# Patient Record
Sex: FEMALE | Race: WHITE | Employment: FULL TIME | URBAN - METROPOLITAN AREA
[De-identification: names, ages, dates, MRNs, and addresses within clinical notes are randomized per-mention and may not be internally consistent; named-entity substitution may affect disease eponyms.]

---

## 2017-04-26 PROBLEM — E06.3 HASHIMOTO'S DISEASE: Status: ACTIVE | Noted: 2017-04-26

## 2017-04-26 PROBLEM — E66.9 OBESITY: Status: ACTIVE | Noted: 2017-04-26

## 2017-04-26 NOTE — H&P
New Patient Evaluation - History and Physical    CONSULT - Reason for Visit: Hashimoto's disease, Obesity  Requesting Physician: Rock Santillan MD    CHIEF COMPLAINT:    Hashimoto's disease   Obesity    HISTORY OF PRESENT ILLNESS:   Marii Nava is a 40 HISTORY:   Family History   Problem Relation Age of Onset   • Alcohol and Other Disorders Associated Father      alcoholism/cirrhosis   • Fibromyalgia Sister        ASSESSMENTS:     REVIEW OF SYSTEMS:  Constitutional: Negative for: weight change, fever, fa ASSESSMENT AND PLAN:      40year old female with    1. Hashimoto's disease. She was hypothyroid in 2013 ( per labs)   She has been off medication since last year. Discussed hashimotos' thyroiditis and hypothyroidism in detail.    Discussed that

## 2017-04-28 NOTE — TELEPHONE ENCOUNTER
Please let the patient know that her thyroid function is low which means she needs to be on treatment with levothyroxine. I recommend starting LT 4 125 daily. This is a weight based dose. Does she want LT4/ synthroid. Please prescribe accordingly.    Vanessa Escamilla

## 2017-04-28 NOTE — TELEPHONE ENCOUNTER
Spoke with Dean Oconnell and informed her of Dr. Deion Lofton message below. Patient understands Dr. Lamont Dye does not recommend that she gets pregnant at this time.  Advised patient to contact office and request to speak with RN if she becomes pregnant to determ

## 2017-08-23 NOTE — PROGRESS NOTES
Return Office Visit     CHIEF COMPLAINT:  Patient presents with:  Thyroid Problem: Hashimoto's disease. BP low. Asymptomatic.         HISTORY OF PRESENT ILLNESS:  Thelma Gonzalez is a 40year old female who presents for follow up for for Hashimoto's diseas for: headache, numbness, weakness  Genito-Urinary: Negative for: dysuria, frequency  Psychiatric: Negative for:  depression, anxiety  Hematology/Lymphatics: Negative for: bruising, lower extremity edema  Endocrine: Negative for: polyuria, polydypsia  Skin: (conversational) exercise.    b) Start slowly and build up gradually    RTC in 6 months.                    Orders Placed This Encounter      Assay, Thyroid Stim Hormone      Free T4, (Free Thyroxine)      8/23/2017  Adrian Desouza MD

## 2017-08-29 NOTE — TELEPHONE ENCOUNTER
LT4 needs to be increased to 137 . Please confirm she is on 125 mcg daily  Repeat TSH and FT4 in 6-8 weeks. Order both please. I will see her in 6 months. Thanks.

## 2017-08-29 NOTE — TELEPHONE ENCOUNTER
Called Shane Graf. Confirmed that she is taking 125 mcg daily. Informed her of Dr. Roni Marin instructions. She verbalized her understanding. Sent Rx. Ordered labs. Pt would prefer to call back to make appt.

## 2018-01-10 NOTE — TELEPHONE ENCOUNTER
Sent letter to patient asking her to schedule follow up in 3 months and complete labwork. Lab orders included.

## 2018-04-21 NOTE — PROGRESS NOTES
HPI:    Patient ID: Jana Velazquez is a 56year old female. HPI came in today complaining of cough  and  congestion.   According to her her  symptoms started on Monday with congestion and for last few days says she is having more sore throat and diffic nervous/anxious. Current Outpatient Prescriptions:  benzonatate 100 MG Oral Cap Take 1 capsule (100 mg total) by mouth 3 (three) times daily as needed.  Disp: 30 capsule Rfl: 0   LEVOTHYROXINE SODIUM 137 MCG Oral Tab TAKE 1 TABLET BY MOUTH DAILY clear and moist and mucous membranes are normal. Mucous membranes are not cyanotic. No oropharyngeal exudate, posterior oropharyngeal edema or posterior oropharyngeal erythema.    Eyes: Conjunctivae and EOM are normal. Pupils are equal, round, and reactive This Visit:  Signed Prescriptions Disp Refills    benzonatate 100 MG Oral Cap 30 capsule 0      Sig: Take 1 capsule (100 mg total) by mouth 3 (three) times daily as needed.            Imaging & Referrals:  None        RT#[id number]

## 2018-04-21 NOTE — PATIENT INSTRUCTIONS
Jourdan Loo, acute- her strep test is negative, advised to drink plenty of fluids plenty of rest I will send Tessalon Perles for the cough if not better if any fever, cough getting worse, call us

## 2019-06-05 PROBLEM — E03.8 OTHER SPECIFIED HYPOTHYROIDISM: Status: ACTIVE | Noted: 2019-06-05

## 2019-06-05 NOTE — PROGRESS NOTES
Return Office Visit     CHIEF COMPLAINT:  Patient presents with:   Follow - Up: Hypothyroidism, hashimotos       HISTORY OF PRESENT ILLNESS:  Geremias Piña is a 53year old female who presents for follow up for hypothyroidism and obesity.     She was kelle palpitations, orthopnea  GI: Negative for:  abdominal pain, nausea, vomiting, diarrhea, constipation, bleeding  Neurology: Negative for: headache, numbness, weakness  Genito-Urinary: Negative for: dysuria, frequency  Psychiatric: Negative for:  depression, IUD  Does not desire pregnancy in the future  Patient understands side effects ( paresthesias, dry mouth, constipation, dysguesia, nasopharyngitis, insomnia) and Contraindications (Recent or unstable cardiac or cerebrovascular disease, pregnancy, glaucoma,

## 2019-06-07 NOTE — TELEPHONE ENCOUNTER
Reviewed thyroid labs  TSH is very high  Called patient, no answer   Please try again  She had reported compliance with medication on LOV.    Please confirm compliance  Please screen for hypothyroidism symptoms  If she is not compliant: start taking medicat

## 2019-06-07 NOTE — TELEPHONE ENCOUNTER
Pt ran out of medication 3 mos ago - RN advised her to resume levothyroxine 137 mcg medication 30-60 mins before breakfast and recheck labs in 6 weeks. RN stressed compliance. Pt verbalized understanding.     Forwarded to AM for review

## 2019-08-06 NOTE — PROGRESS NOTES
Return Office Visit     CHIEF COMPLAINT:  Patient presents with:   Follow - Up: Hashimotos disease, hypothyroidism, weight loss check       HISTORY OF PRESENT ILLNESS:  Maureen Bustamante is a 53year old female who presents for follow up for hypothyroidism a dysphonia  Respiratory: Negative for:  dyspnea, cough  Cardiovascular: Negative for:  chest pain, palpitations, orthopnea  GI: Negative for:  abdominal pain, nausea, vomiting, diarrhea, constipation, bleeding  Neurology: Negative for: headache, numbness, w phentermine-topiramate  Prescribed  Menstrual cycles regular, has an IUD  Does not desire pregnancy in the future  Patient understands side effects ( paresthesias, dry mouth, constipation, dysguesia, nasopharyngitis, insomnia) and Contraindications (Recent

## 2019-09-30 NOTE — TELEPHONE ENCOUNTER
Pt called to give Dr. Leticia Mendez an update after taking phentermine 30 mg for one month. Pt would like to continue taking 30mg so she will need new RX for 30mg. Please call.

## 2019-10-15 NOTE — TELEPHONE ENCOUNTER
LOV 08/06/2019  F/U 11/01/2019    Patient message: Lynda Leggett have a follow up on Nov 1 with Dr Michelle Padgett. Will take my labs on Oct 26th. but I took my last pill today. Pharmacy requested refill days ago but status says delayed.  \"

## 2019-11-01 NOTE — PROGRESS NOTES
Return Office Visit     CHIEF COMPLAINT:  Patient presents with:   Follow - Up: Hypothyroidism       HISTORY OF PRESENT ILLNESS:  Kirk Gordon is a 51year old female who presents for follow up for hypothyroidism and obesity.     She was diagnosed aroun chest pain, palpitations, orthopnea  GI: Negative for:  abdominal pain, nausea, vomiting, diarrhea, constipation, bleeding  Neurology: Negative for: headache, numbness, weakness  Genito-Urinary: Negative for: dysuria, frequency  Psychiatric: Negative for: future  Patient understands side effects ( paresthesias, dry mouth, constipation, dysguesia, nasopharyngitis, insomnia) and Contraindications (Recent or unstable cardiac or cerebrovascular disease, pregnancy, glaucoma, hyperthyroidism, Monoamine oxidase in

## 2020-02-12 NOTE — PROGRESS NOTES
Return Office Visit     CHIEF COMPLAINT:  Patient presents with:   Follow - Up: Hypothyroidism, obesity       HISTORY OF PRESENT ILLNESS:  Fantasma Laguerre is a 51year old female who presents for follow up for hypothyroidism and obesity.     She was diagno for: fever, fatigue, cold/heat intolerance, + weight loss ( intentional)  Eyes: Negative for:  Visual changes, proptosis, blurring  ENT: Negative for:  dysphagia, neck swelling, dysphonia  Respiratory: Negative for:  dyspnea, cough  Cardiovascular: Negativ daily walks   a) Equal to or more than 150 hours of moderate intensity (conversational) exercise.      3. C.w phentermine-topiramate  Prescribed, increased dose of phentermine  Menstrual cycles regular ( DLMP: about three weeks ago), has an IUD  Does not de

## 2020-06-05 NOTE — TELEPHONE ENCOUNTER
Spoke with pt, she was just asking if it was okay to convert her follow up visit on 6/10 to phone visit? She picked up her prescription for levothyroxine this past weekend and cannot find it anywhere. Her last dose was Monday, 6/1. LOV 2/12/20.  [employer]

## 2020-06-05 NOTE — TELEPHONE ENCOUNTER
Pt states she would like to changer her 6-10 appt to a telephone visit and also pt states she picked up her prescription for levothyroxine and can not find it and took her last pill on Monday.  Please advise

## 2020-06-10 NOTE — TELEPHONE ENCOUNTER
Please book at 9:45 am on 9/9  In person  Orlando Witt open the schedule for wed . Fridays not sure why these are closed    Thanks

## 2020-06-10 NOTE — PROGRESS NOTES
Simran Pinedo verbally consents to a phone visit on 6/10/2020     Patient understands and accepts financial responsibility for any deductible, co-insurance and/or co-pays associated with this service. Patient has been referred to the [facility] website at www. reviewed.     ASSESSMENTS:       REVIEW OF SYSTEMS:  Constitutional: Negative for: fever, fatigue, cold/heat intolerance, + weight gain  Eyes: Negative for:  Visual changes, proptosis, blurring  ENT: Negative for:  dysphagia, neck swelling, dysphonia  Respi Contraindications (Recent or unstable cardiac or cerebrovascular disease, pregnancy, glaucoma, hyperthyroidism, Monoamine oxidase inhibitor use ). RTC in 3 months.      Patient verbalized a complete  understanding of all of the above and did not have any

## 2020-09-09 NOTE — PROGRESS NOTES
Return Office Visit     CHIEF COMPLAINT:  Patient presents with:  Hypothyroidism: follow up       HISTORY OF PRESENT ILLNESS:  Courtney Shi is a 51year old female who presents for follow up for hypothyroidism and obesity.     She was diagnosed around depression, anxiety  Hematology/Lymphatics: Negative for: bruising, lower extremity edema  Endocrine: Negative for: polyuria, polydypsia  Skin: Negative for: rash, blister, cellulitis,       PHYSICAL EXAM:    09/09/20  0953   BP: 90/62   Pulse: 67   Resp: Monoamine oxidase inhibitor use ). RTC in 3 months. Patient verbalized a complete  understanding of all of the above and did not have any further questions.                    No orders of the defined types were placed in this encounter.         Felicityt

## 2020-09-11 NOTE — TELEPHONE ENCOUNTER
[facility] called to confirm additional refills:       Current Outpatient Medications:   •  Levothyroxine Sodium 137 MCG Oral Tab, TAKE 1 TABLET BY MOUTH DAILY BEFORE BREAKFAST, Disp: 90 tablet, Rfl: 0  •  topiramate 50 MG Oral Tab, Take 1 tablet (

## 2021-07-06 NOTE — PROGRESS NOTES
Return Office Visit     CHIEF COMPLAINT:  Patient presents with:  Hypothyroidism: Pt is present to follow up with the doctor pt states that she has been gaining weight.     BMI > 30      HISTORY OF PRESENT ILLNESS:  Edgar Lora is a 53year old female palpitations, orthopnea  GI: Negative for:  abdominal pain, nausea, vomiting, diarrhea, constipation, bleeding  Neurology: Negative for: headache, numbness, weakness  Genito-Urinary: Negative for: dysuria, frequency  Psychiatric: Negative for:  depression, (conversational) exercise.      3. Reviewed wegovy, she will like to try  No personal or family history of MEN syndrome  Patient counselled regarding side effects including injection site reactions, nausea, vomiting, diarrhea, pancreatitis, gastroparesis an

## 2021-07-06 NOTE — TELEPHONE ENCOUNTER
Spoke to patient in regard to Dr. Abbie Fernandez note below. Patient verbalized understanding and will get labs redrawn in 6 weeks and start on the LT4 137 MCG daily as soon as possible. Prescription has been sent.

## 2021-07-07 NOTE — TELEPHONE ENCOUNTER
Vitd D is low   Recommend  Ergocalciferol 50,000 units q week x 10 weeks  We can repeat levels at her 3 month FU  Thanks

## 2021-07-07 NOTE — TELEPHONE ENCOUNTER
rn called patient with message by dr Rodolfo Bennett, patient verbalized understanding .   rx sent to pharmacy and lab ordered

## 2021-07-30 NOTE — TELEPHONE ENCOUNTER
Received fax from [facility] for coverage for Le mars or Saxenda. \"Coverage for [place] mars or Saxenda under patient's insurane must be confirmed before [facility] can issue savings offer.  Please advise by checking the box under

## 2021-08-07 NOTE — PROGRESS NOTES
Return Office Visit     CHIEF COMPLAINT:    Hypothyroidism  Weight management  Vitamin D deficiency    HISTORY OF PRESENT ILLNESS:  Marnell Eisenmenger is a 53year old female who presents for follow up for hypothyroidism and BMI 34.     She was diagnosed naima blurring  ENT: Negative for:  dysphagia, neck swelling, dysphonia  Respiratory: Negative for:  dyspnea, cough  Cardiovascular: Negative for:  chest pain, palpitations, orthopnea  GI: Negative for:  abdominal pain, nausea, vomiting, diarrhea, constipation, personal or family history of MEN syndrome  Patient counselled regarding side effects including injection site reactions, nausea, vomiting, diarrhea, pancreatitis, gastroparesis and rare side effect seng Eren syndrome.     4. Vitamin D deficiency  Is o

## 2021-09-03 NOTE — TELEPHONE ENCOUNTER
Vitamin D is better    Please switch to ergocalciferol 50,000 units q month    Thyroid labs much better  TSH is still slightly high  LT4 CPM   Please repeat labs before next OV  TSH and Free T4    Thanks

## 2021-09-05 NOTE — TELEPHONE ENCOUNTER
From: Alphonso Gave  To: Shady Coyle MD  Sent: 9/4/2021 12:27 PM CDT  Subject: Prescription Question    Hi could you refill my prescription for both [facility]FORT ELVIRA and levothyraxine based on my lab results.  I am leaving 9/9 on a 3 week trip and need to [place]

## 2021-09-08 NOTE — TELEPHONE ENCOUNTER
From: Dorita Solorzano  To: Jenna Chavis MD  Sent: 9/7/2021 3:10 PM CDT  Subject: Prescription Question    Yes please I'd like to up dosage for both.      Thyroid labs indicate need for dose increase  Pleaes confirm that you are on levothyroxine 137 mcg

## 2021-09-08 NOTE — TELEPHONE ENCOUNTER
rn called patient with message by dr Hart Messenger below ,patient verbalized understanding but states there is other message where dr joseph is increasing her levothyroxine to 150 mcg dly and needs wegovy increase dose as well  rx sent pended

## 2021-09-08 NOTE — TELEPHONE ENCOUNTER
See patient response. Would like to go up on Levothyroxine dose and Wegovy. Pended both prescriptions.

## 2021-10-01 NOTE — TELEPHONE ENCOUNTER
Replied to patient's mychart message with suggestion of changing pharmacy to [facility]. Awaiting patient response.

## 2021-10-01 NOTE — TELEPHONE ENCOUNTER
[facility]FORT ELVIRA prescription resent to [facility]. Contact information provided.

## 2021-10-01 NOTE — TELEPHONE ENCOUNTER
From: Kirk Gordon  To: Stephen Carrizales MD  Sent: 10/1/2021 11:31 AM CDT  Subject: HNSQRP prescription    the wegovy prescription that was put through at [facility] a couple of weeks ago has not been filled.  I've been waiting for the prescription and t

## 2021-10-28 NOTE — TELEPHONE ENCOUNTER
Pharmacy called to request RX for 1.7mg/once weekly.         Current Outpatient Medications:   •  semaglutide-weight management (WEGOVY) 1 MG/0.5ML Subcutaneous Solution Auto-injector, Inject 0.5 mL (1 mg total) into the skin once a week., Disp: 4 each, Rfl

## 2021-11-04 NOTE — TELEPHONE ENCOUNTER
Since Dr. Toshia Trujillo already approved the script, RN resent script again. WEGOVY 1.7 MG/0.75ML Subcutaneous Solution Auto-injector 3 mL 0 11/4/2021     Sig - Route: Inject 0.75 mL (1.7 mg total) into the skin once a week.  - Subcutaneous    Sent to [facility]

## 2021-11-13 NOTE — PROGRESS NOTES
Return Office Visit     CHIEF COMPLAINT:    Hypothyroidism  Weight management  Vitamin D deficiency    HISTORY OF PRESENT ILLNESS:  Leona Serrano is a 49year old female who presents for follow up for hypothyroidism and BMI 34.     She was diagnosed naima dysphonia  Respiratory: Negative for:  dyspnea, cough  Cardiovascular: Negative for:  chest pain, palpitations, orthopnea  GI: Negative for:  abdominal pain, nausea, vomiting, diarrhea, constipation, bleeding  Neurology: Negative for: headache, numbness, w anxiety  Reviewed SE again  No plans to get pregnant  Understands that phentermine is teratogenic and takes precautions      4. Vitamin D deficiency  Is on monthly  replacement  Will repeat levels      RTC in 3 months.      Patient verbalized a complete  un

## 2022-03-03 NOTE — TELEPHONE ENCOUNTER
Spoke with patient and she does not need refill. Rx was sent 2/27/22. Spoke with patient and scheduled video visit 3/29/22 per TE 2/25/22. She will have labs as ordered done before appt.

## 2022-05-03 NOTE — TELEPHONE ENCOUNTER
Phentermine HCl 30 MG Oral Cap, Take 1 capsule (30 mg total) by mouth every morning., Disp: 90 capsule, Rfl: 0    Key:   [id number]

## 2022-05-04 NOTE — TELEPHONE ENCOUNTER
Medication PA Requested:                phentermine                                           CoverMyMeds Used:  Key: [id number]   Quantity: 30  Day Supply: 28  Sig: take 1 capsule by mouth every morning  DX Code:                                     CPT code (if applicable):   Case Number/Pending Ref#:

## 2022-05-04 NOTE — TELEPHONE ENCOUNTER
If sh switches to a tablet form, generally it is cheaper via cash pay  Can we look into that options  Thanks

## 2022-05-05 NOTE — TELEPHONE ENCOUNTER
Please call the pharmacy to check if they have the 15 mg tablets  Otherwise can do the 37.5 mg tablet with instruction to take half tablet daily   Thanks

## 2022-05-07 NOTE — TELEPHONE ENCOUNTER
Noted that the patient was on 15 mg daily and plan was to go up to 30 mg daily  Since tabs are only available in 37.5 mg dose, will prescribe that and let the patient know Thansk

## 2022-06-30 NOTE — TELEPHONE ENCOUNTER
Patient states on Tuesday she received her 2nd Covid booster. Yesterday she began to feel ill with fever, body aches. Today patient reports sore throat, cough with green phlegm, fever, fatigue, body aches, laryngitis. Denies shortness of breath, nausea, vomiting, diarrhea. Patient believes she may have bronchitis as she's had in the past and feels similar.     Scheduled virtual appt today with LEANNA Wang at 1:15 pm.

## 2022-07-26 NOTE — TELEPHONE ENCOUNTER
LVV 3/29/22  RTC 3 months  Called and got no answer. LMTCB. [facility] sent requesting patient schedule a follow up appointment.    Pended 3 month supply for review

## 2022-11-29 NOTE — ED INITIAL ASSESSMENT (HPI)
Pt came in due to cough, congestion, runny nose, and sore throat for the past 2 days. Pt has easy non labored respirations.

## 2022-12-05 NOTE — TELEPHONE ENCOUNTER
Dr. Arash Apple to patient - she last took levothyroxine 150mcg on 11/22/22 - prior to that was taking consistently  Patient completed labs on 12/3/22    Patient stated understanding to start ergocalciferol 50,000 units weekly and repeat labs in 8-10 weeks    Please advise on dose of levothyroxine- patient needs refill/new RX  Thanks

## 2022-12-05 NOTE — TELEPHONE ENCOUNTER
LM advising patient to call clinic to advise:   If she is taking levothyroxine 150mcg daily and if she was able to  ergocalciferol  50,000 units RX

## 2023-02-10 NOTE — PROGRESS NOTES
Patient did not fill out mandatory questionnaires. I sent her a reminder in [address] yesterday which was never read. CSS:  If patient calls please assist with rescheduling TCS and ensure she knows to complete questionnaires or schedule in person office visit.     Thank you

## 2023-03-09 NOTE — TELEPHONE ENCOUNTER
Vitamin D and TSH are in the system from 12/4/23. Spoke with patient and notified her that labs are in the system.

## 2023-03-22 NOTE — TELEPHONE ENCOUNTER
Patient states that the pharmacy did not get prescription for the phentermine. Patient requesting for the nurse to send prescription.

## 2023-03-23 NOTE — TELEPHONE ENCOUNTER
Dr. Carlos Pastrana    We are unable to call in controlled substances.  Please advise if we can fax tomorrow

## 2023-03-23 NOTE — TELEPHONE ENCOUNTER
Please call it in   I have faxed but did not go through it seems  Please my chart the patient once called in   Thanks

## 2023-03-24 NOTE — TELEPHONE ENCOUNTER
Confirmed that pharmacy did receive Rx. However, PA is needed. Was advised patient can also try using good Rx coupon to see if its affordable that way. Called patient to give update. No answer, lmtcb. Sent [employer] also with [email or web address] link. Routed FYI.

## 2023-03-25 NOTE — TELEPHONE ENCOUNTER
Pharmacy has confirmed receipt of faxed script. Refused pending order, pt to contact again if cannot obtain via good rx, pt has read the mcm sent yesterday.

## 2024-01-04 NOTE — TELEPHONE ENCOUNTER
Action Requested: Summary for Provider     []  Critical Lab, Recommendations Needed  [] Need Additional Advice  []   FYI    []   Need Orders  [] Need Medications Sent to Pharmacy  []  Other     SUMMARY: Per protocol: OV    Future Appointments   Date Time Provider Department Center   1/22/2024  6:30 PM Carlos Snyder MD [facility]   1/23/2024  9:00 AM Jaskaran Lopes MD [address]     Reason for call: Menstrual Problem  Onset: Data Unavailable    Patient missed a period in November. Now she has had her period since 12/14/23. It goes from light to heavy on and off. She is unsure if this due to perimenopause. She does have an IUD. She denies dizziness or lightheadedness.      Reason for Disposition   Periods last > 7 days    Protocols used: Vaginal Bleeding - [id number]

## 2024-01-23 PROBLEM — E78.5 DYSLIPIDEMIA: Status: ACTIVE | Noted: 2024-01-23

## 2024-01-23 PROBLEM — E66.9 OBESITY (BMI 30-39.9): Status: ACTIVE | Noted: 2017-04-26

## 2024-01-23 PROBLEM — F43.9 STRESS: Status: ACTIVE | Noted: 2024-01-23

## 2024-01-23 NOTE — PROGRESS NOTES
[facility]ation Note       Patient:  Lashae Andrews  :      1970  MRN:      [id number]    Referring Provider: Dr. Anderson       Chief Complaint:    Chief Complaint   Patient presents with    Consult    Weight Management       SUBJECTIVE     History of Present Illness:  Lashae Andrews has been referred to me for evaluation and treatment.       52 yo who lives with family  Splits the cooking  Desk job  Currently at heaviest weight     Patient has tried several diets in the past including exercises and is frustrated with increase of weight. Weight has been a struggle for the past several years and is now starting to develop into co-morbidities that are worrisome to the patient. Patient is interested in losing weight, so it can stay off long term.    Patient also understands that this is a life style change and wants to get on track.    Interested in non surgical weight loss    Past Medical History:   Past Medical History:   Diagnosis Date    Lipid screening 2014    Obesity (BMI 30-39.9)     Prediabetes     on metformin x 3 months    Primary hypothyroidism     synthroid 75 mcg       OBJECTIVE     Vitals: /72 (BP Location: Right arm, Patient Position: Sitting, Cuff Size: adult)   Pulse 64   Ht 5' 2\" (1.575 m)   Wt 188 lb (85.3 kg)   LMP 2023 (Exact Date)   SpO2 95%   BMI 34.39 kg/m²      Patient Medications:    Current Outpatient Medications   Medication Sig Dispense Refill    levothyroxine 150 MCG Oral Tab Take 1 tablet (150 mcg total) by mouth before breakfast. 90 tablet 1       Allergies:  Patient has no known allergies.     Comorbidities:  dyslipidemia    Social History:    Social History     Socioeconomic History    Marital status:      Spouse name: Not on file    Number of children: Not on file    Years of education: Not on file    Highest education level: Not on file   Occupational History    Not on file   Tobacco Use    Smoking status:  Former     Packs/day: .1     Types: Cigarettes     Quit date: 3/30/2020     Years since quitting: 3.8    Smokeless tobacco: Never    Tobacco comments:     1-2 cigs daily   Substance and Sexual Activity    Alcohol use: Yes     Alcohol/week: 1.0 standard drink of alcohol     Types: 1 Standard drinks or equivalent per week    Drug use: No    Sexual activity: Yes     Birth control/protection: Paragard   Other Topics Concern     Service Not Asked    Blood Transfusions Not Asked    Caffeine Concern No    Occupational Exposure Not Asked    Hobby Hazards Not Asked    Sleep Concern Not Asked    Stress Concern Not Asked    Weight Concern Not Asked    Special Diet Not Asked    Back Care Not Asked    Exercise Not Asked    Bike Helmet Not Asked    Seat Belt Not Asked    Self-Exams Not Asked   Social History Narrative    Not on file     Social Determinants of Health     Financial Resource Strain: Not on file   Food Insecurity: Not on file   Transportation Needs: Not on file   Physical Activity: Not on file   Stress: Not on file   Social Connections: Not on file   Housing Stability: Not on file     Surgical History:  No past surgical history on file.    Family History:    Family History   Problem Relation Age of Onset    Alcohol and Other Disorders Associated Father         alcoholism/cirrhosis    Fibromyalgia Sister     Breast Cancer Neg     Ovarian Cancer Neg            Typical Dietary Intake:  Breakfast AM Snack Lunch PM Snack Dinner   Coffee, cream  Oatmeal  Left overs, chicken, rice, veggies fruit Rice, chicken, ground beef, veggies, mexican food,      Soda Drinker?: No  If yes, how much?:  calixto johnson    Number of restaurant or fast food meals/week:  1 meals/week    Nutritional Goals Reviewed and Discussed:     Limit carbohydrates to 100 gms per day, Eat 100-200 calories within 1 hour of waking up, and Eat 3-4 cups of fresh fruit or vegetables daily    Behavior Modifications Reviewed and Discussed:    Eat breakfast, Eat  3 meals per day, Plan meals in advance, Read nutrition labels, Drink 64oz of water per day, Maintain a daily food journal, No drinking 30 minutes before or after meals, Utilize portion control strategies to reduce calorie intake, Identify triggers for eating and manage cues, and Eat slowly and take 20 to 30 minutes to complete each meal      ROS:  Constitutional: negative  Respiratory: negative  Cardiovascular: negative  Gastrointestinal: negative  Musculoskeletal:negative  Neurological: negative  Behavioral/Psych: positive for stress  Endocrine: negative  All other systems were reviewed and are negative.    Physical Exam:  General appearance: alert, appears stated age, cooperative, and mildly obese  Head: Normocephalic, without obvious abnormality, atraumatic  Lungs: clear to auscultation bilaterally  Heart: S1, S2 normal, no murmur, click, rub or gallop, regular rate and rhythm  Abdomen:  soft, obese, non tender  Extremities: extremities normal, atraumatic, no cyanosis or edema  Skin: Skin color, texture, turgor normal. No rashes or lesions  Neurologic: Grossly normal    ASSESSMENT         Encounter Diagnosis(ses):   1. Dyslipidemia    2. Stress    3. Obesity (BMI 30-39.9)        PLAN     Patient is not interested in bariatric surgery. Patient desires to pursue traditional weight loss at this time.      DYSLIPIDEMIA: Stable on the above prescribed meal plan . Liver function stable.    Lab Results   Component Value Date/Time    CHOLEST 206 (H) 01/18/2023 11:48 AM     (H) 01/18/2023 11:48 AM    HDL 52 01/18/2023 11:48 AM    TRIG 70 01/18/2023 11:48 AM    VLDL 13 01/18/2023 11:48 AM     Stress: refer to Dr Stallworth    Goals for next month:  1. Keep a food log.  2. Drink 48-64 ounces of non-caloric beverages per day. No fruit juices or regular soda.  3. Increase activity-upper body exercises, walk 10 minutes per day.  4. Increase fruit and vegetable servings to 5-6 per day.      PHENTERMINE: Since the patient  would like to try phentermine, and is aware of the potential side effects (hypertension, palpitations, tachycardia, and anxiety), I will give Lashae Andrews a prescription today to be used in conjunction with the above diet and exercise program. The patient will check her heart rate and blood pressure on a regular basis. She will call me if her BP goes over 140/90 or if she has palpitations or racing heart rate. She understands that I will not call in the prescription for her; she has to have an appointment to have the medication refilled.   Will start at 15 mg  Needs EKG    Refer to psych    Diagnoses and all orders for this visit:    Dyslipidemia    Stress    Obesity (BMI 30-39.9)        Jaskaran Lopes MD

## 2024-01-23 NOTE — H&P
HPI:    Lashae Andrews is a 53 year old female presents clinic for follow-up.  Has been experiencing irregular menstrual cycles.  Had cycle last month which lasted for a few weeks.  Typically bleeds for 3 to 4 days.  Also has been experiencing some hot flashes.  Additionally, patient has been under a lot of stress.  Has been struggling with his alcoholism.  Also cares for her children, works full-time.  She is thinking about moving in the spring.  Denies thoughts of self-harm    HISTORY:  Past Medical History:   Diagnosis Date    Lipid screening 05/03/2014    Obesity (BMI 30-39.9)     Prediabetes     on metformin x 3 months    Primary hypothyroidism     synthroid 75 mcg      No past surgical history on file.   Family History   Problem Relation Age of Onset    Alcohol and Other Disorders Associated Father         alcoholism/cirrhosis    Fibromyalgia Sister     Breast Cancer Neg     Ovarian Cancer Neg       Social History:   Social History     Socioeconomic History    Marital status:    Tobacco Use    Smoking status: Former     Packs/day: .1     Types: Cigarettes     Quit date: 3/30/2020     Years since quitting: 3.8    Smokeless tobacco: Never    Tobacco comments:     1-2 cigs daily   Substance and Sexual Activity    Alcohol use: Yes     Alcohol/week: 1.0 standard drink of alcohol     Types: 1 Standard drinks or equivalent per week    Drug use: No    Sexual activity: Yes     Birth control/protection: Paragard   Other Topics Concern    Caffeine Concern No        Medications (Active prior to today's visit):  Current Outpatient Medications   Medication Sig Dispense Refill    Phentermine HCl 15 MG Oral Cap Take 1 capsule (15 mg total) by mouth every morning. 30 capsule 5    levothyroxine 150 MCG Oral Tab Take 1 tablet (150 mcg total) by mouth before breakfast. 90 tablet 0       Allergies:  No Known Allergies      Depression Screening (PHQ-2/PHQ-9): Over the LAST 2 WEEKS   Little interest or pleasure in doing  things: Not at all    Feeling down, depressed, or hopeless: Not at all    PHQ-2 SCORE: 0           ROS:   Review of Systems   All other systems reviewed and are negative.      PHYSICAL EXAM:     Vitals:    01/22/24 1741   BP: 118/78   BP Location: Left arm   Patient Position: Sitting   Cuff Size: large   Pulse: 71   Resp: 19   SpO2: 98%   Weight: 188 lb (85.3 kg)   Height: 5' 2\" (1.575 m)     Physical Exam  Constitutional:       General: She is not in acute distress.  Cardiovascular:      Rate and Rhythm: Normal rate.   Pulmonary:      Effort: Pulmonary effort is normal. No respiratory distress.   Neurological:      Mental Status: She is alert.   Psychiatric:         Mood and Affect: Mood normal.         ASSESSMENT/PLAN:   (N92.6) Irregular menstrual bleeding  (primary encounter diagnosis)  Plan:   -Could be secondary to stress/perimenopause.  At some point, she will need to remove her ParaGard IUD.  Will monitor next few cycles and schedule follow-up if needed.     (F43.9) Stress  Plan: [facility] Referral - In Network  -Could benefit from therapy, [facility] referral placed    (E06.3) Hashimoto's disease  Plan:   -No changes in symptoms, follows with endocrinology.  Levothyroxine refilled     (Z12.31) Visit for screening mammogram  Plan: [facility] 2D+3D SCREENING BILAT         (CPT=77067/64343)             Responsible party/patient verbalized understanding of information discussed. No barriers to learning observed.          Orders This Visit:  Orders Placed This Encounter   Procedures    Zoster Recombinant Adjuvanted (Shingrix -Shingles) [19802]       Meds This Visit:  Requested Prescriptions      No prescriptions requested or ordered in this encounter       Imaging & Referrals:  ZOSTER VACC RECOMBINANT IM NJX  OP REFERRAL TO [facility] 2D+3D SCREENING BILAT (CPT=77067/49605)       The 21st Century cures Act makes medical notes like these available to patients in the interest of transparency.  However, be advised  that this is a medical document.  It is intended as peer to peer communication.  It is written in medical language and may contain abbreviations or verbiage that are unfamiliar.  It may appear blunt or direct.  Medical documents are intended to carry relevant information, facts as evident, and the clinical opinion of the practitioner.      This note was created by [employer] voice recognition. Errors in content may be related to improper recognition by the system; efforts to review and correct have been done but errors may still exist. Please contact me with any questions.       1/23/2024  Carlos Snyder MD

## 2024-06-14 NOTE — PROGRESS NOTES
Return Office Visit     CHIEF COMPLAINT:    Hypothyroidism  Weight management      HISTORY OF PRESENT ILLNESS:  Lashae Andrews is a 53 year old female who presents for follow up for hypothyroidism and BMI > 30 .     She was diagnosed around age 44.     FH of thyroid disease: Cousin    She is on LT 4 150 mcg daily.   She reports compliance with medication  Takes it empty stomach    She is on phentermine-topiramate--> follows with Dr. Lopes                CURRENT MEDICATION:    Current Outpatient Medications   Medication Sig Dispense Refill    Phentermine HCl 15 MG Oral Cap Take 1 capsule (15 mg total) by mouth every morning. 30 capsule 5    levothyroxine 150 MCG Oral Tab Take 1 tablet (150 mcg total) by mouth before breakfast. 90 tablet 0         ALLERGY:  No Known Allergies    PAST MEDICAL, SOCIAL AND FAMILY HISTORY:  See past medical history marked as reviewed.  See past surgical history marked as reviewed.  See past family history marked as reviewed.  See past social history marked as reviewed.    ASSESSMENTS:       REVIEW OF SYSTEMS:  Constitutional: Negative for: fever, fatigue, cold/heat intolerance,  weight change  Eyes: Negative for:  Visual changes, proptosis, blurring  ENT: Negative for:  dysphagia, neck swelling, dysphonia  Respiratory: Negative for:  dyspnea, cough  Cardiovascular: Negative for:  chest pain, palpitations, orthopnea  GI: Negative for:  abdominal pain, nausea, vomiting, diarrhea, constipation, bleeding  Neurology: Negative for: headache, numbness, weakness  Genito-Urinary: Negative for: dysuria, frequency  Psychiatric: Negative for:  depression, + anxiety, chronic better  Hematology/Lymphatics: Negative for: bruising, lower extremity edema  Endocrine: Negative for: polyuria, polydypsia  Skin: Negative for: rash, blister, cellulitis,       PHYSICAL EXAM:     Vitals reviewed    General Appearance:  alert, well developed, in no acute distress  Head: Atraumatic  Eyes:  normal conjunctivae,  sclera., normal sclera and normal pupils  Throat/Neck: normal sound to voice. Normal hearing, normal speech  Respiratory:  Speaking in full sentences, non-labored. no increased work of breathing, no audible wheezing  , no significant thyroid enlargement palpated  Skin:  normal moisture and skin texture, no visible lesions  Hematologic:  no excessive bruising  Neuro: motor grossly intact, moving all extremities without difficulty  Psychiatric:  oriented to time, self, and place,+  Anxiety ( stable), no SI no HI  Extremities: no obvious extremity swelling, no lesions      DATA:     Reviewed pertinent labs    ASSESSMENT AND PLAN:      53 year old female with     1. Hypothyroidism  LT4 150 mcg daily  Will check labs today   CPM till then   Discussed administration.   Discussed compliance , reviewed complications from uncontrolled hypothyroidism including myxedema coma         2. Vitamin D deficiency   On OTC vit D , can not recall dose  Will check 25 OH vit D level        RTC in 10 months.   Call for results    Patient verbalized a complete  understanding of all of the above and did not have any further questions.                    Latoya Anderson MD

## 2024-06-20 NOTE — TELEPHONE ENCOUNTER
I had sent the patient a message on 6/14 regarding her test results.  It seems like patient has read the message.  However we can please call her and review the message again and please let me know if she has any further questions.  Thanks.  Hello, TSH is high which indicates need for increase in dose of thyroid medication Sine you are on levothyroxine  150 mcg daily, recommend that you please increase this to 175 mcg daily and repeat labs in 8 weeks. I will send medication and order labs for you  Vitamin D is low, Hence recommend starting  Ergocalciferol 50,000 units once a week for 8  weeks, followed by one capsule once a month  Please repeat labs in 10 weeks     Thanks

## 2024-06-20 NOTE — TELEPHONE ENCOUNTER
Dr. Anderson -- pt called wanting know her most recent lab results on 6/14.     Component      Latest Ref Rng 6/14/2024   VITAMIN D, 25-OH, TOTAL      30.0 - 100.0 ng/mL 23.0 (L)       Component      Latest Ref Rng 6/14/2024   TSH      0.550 - 4.780 mIU/mL 7.286 (H)       Component      Latest Ref Rng 6/14/2024   T4,Free (Direct)      0.8 - 1.7 ng/dL 1.3

## 2024-07-03 ENCOUNTER — HOSPITAL ENCOUNTER (OUTPATIENT)
Dept: MAMMOGRAPHY | Age: 54
Discharge: HOME | End: 2024-07-03
Attending: FAMILY MEDICINE
Payer: COMMERCIAL

## 2024-07-03 DIAGNOSIS — Z12.31 VISIT FOR SCREENING MAMMOGRAM: ICD-10-CM

## 2024-07-03 PROCEDURE — 77067 SCR MAMMO BI INCL CAD: CPT | Performed by: FAMILY MEDICINE

## 2024-07-03 PROCEDURE — 77063 BREAST TOMOSYNTHESIS BI: CPT | Performed by: FAMILY MEDICINE

## 2024-07-04 ENCOUNTER — HOSPITAL ENCOUNTER (OUTPATIENT)
Age: 54
Discharge: HOME | End: 2024-07-04
Payer: COMMERCIAL

## 2024-07-04 VITALS
SYSTOLIC BLOOD PRESSURE: 147 MMHG | RESPIRATION RATE: 20 BRPM | DIASTOLIC BLOOD PRESSURE: 76 MMHG | TEMPERATURE: 98 F | HEART RATE: 80 BPM | OXYGEN SATURATION: 98 %

## 2024-07-04 DIAGNOSIS — H10.9 BACTERIAL CONJUNCTIVITIS: ICD-10-CM

## 2024-07-04 DIAGNOSIS — Z20.822 ENCOUNTER FOR LABORATORY TESTING FOR COVID-19 VIRUS: ICD-10-CM

## 2024-07-04 DIAGNOSIS — J04.0 ACUTE LARYNGITIS: Primary | ICD-10-CM

## 2024-07-04 LAB
S PYO AG THROAT QL: NEGATIVE
SARS-COV-2 RNA RESP QL NAA+PROBE: NOT DETECTED

## 2024-07-04 PROCEDURE — 87880 STREP A ASSAY W/OPTIC: CPT | Performed by: PHYSICIAN ASSISTANT

## 2024-07-04 PROCEDURE — 99203 OFFICE O/P NEW LOW 30 MIN: CPT | Performed by: PHYSICIAN ASSISTANT

## 2024-07-04 PROCEDURE — U0002 COVID-19 LAB TEST NON-CDC: HCPCS | Performed by: PHYSICIAN ASSISTANT

## 2024-07-04 RX ORDER — POLYMYXIN B SULFATE AND TRIMETHOPRIM 1; 10000 MG/ML; [USP'U]/ML
1 SOLUTION OPHTHALMIC EVERY 6 HOURS
Qty: 10 ML | Refills: 0 | Status: SHIPPED | OUTPATIENT
Start: 2024-07-04 | End: 2024-07-09

## 2024-07-04 NOTE — ED INITIAL ASSESSMENT (HPI)
Pt presents with cough, congestion, irchy, watery eyes and sore throat x 2 days. Pt reports yesterday became worse.     Pt reports. \"Working in [place] 2-3 days ago\" symptoms developed soon after.

## 2024-07-04 NOTE — ED PROVIDER NOTES
Patient Seen in: [facility]      History     Chief Complaint   Patient presents with    Cough/URI     Stated Complaint: Sore Throat    Subjective:   HPI    Patient is a 53-year-old female who presents to immediate care due to cough x 2 days.  Associate symptoms include sinus congestion and sore throat.  Denies any at-home treatment.  Denies chest pain shortness of breath or wheezing or fever    Objective:   Past Medical History:    Lipid screening    Obesity (BMI 30-39.9)    Prediabetes    on metformin x 3 months    Primary hypothyroidism    synthroid 75 mcg              History reviewed. No pertinent surgical history.             Social History     Socioeconomic History    Marital status:    Tobacco Use    Smoking status: Former     Current packs/day: 0.00     Types: Cigarettes     Quit date: 3/30/2020     Years since quittin.2    Smokeless tobacco: Never    Tobacco comments:     1-2 cigs daily   Substance and Sexual Activity    Alcohol use: Yes     Alcohol/week: 1.0 standard drink of alcohol     Types: 1 Standard drinks or equivalent per week    Drug use: No    Sexual activity: Yes     Birth control/protection: Paragard   Other Topics Concern    Caffeine Concern No              Review of Systems    Positive for stated Chief Complaint: Cough/URI    Other systems are as noted in HPI.  Constitutional and vital signs reviewed.      All other systems reviewed and negative except as noted above.    Physical Exam     ED Triage Vitals [24 1008]   /76   Pulse 80   Resp 20   Temp 97.8 °F (36.6 °C)   Temp src Temporal   SpO2 98 %   O2 Device None (Room air)       Current Vitals:   Vital Signs  BP: 147/76  Pulse: 80  Resp: 20  Temp: 97.8 °F (36.6 °C)  Temp src: Temporal    Oxygen Therapy  SpO2: 98 %  O2 Device: None (Room air)            Physical Exam    Vital signs reviewed. Nursing note reviewed.  Constitutional: Well-developed. Well-nourished. In no acute distress  HENT: Mucous  membranes moist. TMs intact bilaterally. No trismus. Uvula midline. Mild posterior pharynx erythema.  No petechiae, exudates, or posterior pharynx edema.  EYES: No scleral icterus or conjunctival injection.  NECK: Full ROM. Supple.   CARDIAC: Normal rate. Normal S1/ S2. 2+ distal pulses. No edema  PULM/CHEST: Clear to auscultation bilaterally. No wheezes  Extremities: Full ROM  NEURO: Awake, alert, following commands, moving extremities, answering questions.   SKIN: Warm and dry. No rash or lesions.  PSYCH: Normal judgment. Normal affect.        ED Course     Labs Reviewed   POCT RAPID STREP - Normal   RAPID SARS-COV-2 BY PCR - Normal                      MDM      Patient is a healthy 53-year-old female who presents to immediate care due to cough x 2 days.  Patient arrives with stable vitals speaking complete sentences in no respiratory distress.  Physical exam unremarkable with lungs clear to auscultation.  Most likely viral URI, acute cough, acute sinusitis.  Less likely COVID-19, strep pharyngitis as patient had rapid negative test today in immediate care.  Less likely bacterial sinusitis, pneumonia.  Discussed supportive treatment including Tylenol and ibuprofen as needed.  Antihistamine such as Claritin or Zyrtec and decongestant such as Sudafed.  Return precautions including worsening cough, fever chest pain shortness of breath.  History given by patient.  Patient agreeable to plan all questions answered.                                     Medical Decision Making      Disposition and Plan     Clinical Impression:  1. Acute laryngitis    2. Encounter for laboratory testing for COVID-19 virus    3. Bacterial conjunctivitis         Disposition:  Discharge  7/4/2024 10:44 am    Follow-up:  Carlos Snyder MD  [address] [place] [id number]  [phone]    Call             Medications Prescribed:  Discharge Medication List as of 7/4/2024 10:44 AM        START taking these medications    Details    polymyxin B-trimethoprim 31010-9.1 UNIT/ML-% Ophthalmic Solution Apply 1 drop to eye every 6 (six) hours for 5 days., Normal, Disp-10 mL, R-0

## 2024-07-22 ENCOUNTER — OFFICE VISIT (OUTPATIENT)
Dept: SURGERY | Facility: CLINIC | Age: 54
End: 2024-07-22
Payer: COMMERCIAL

## 2024-07-22 VITALS
OXYGEN SATURATION: 96 % | HEART RATE: 77 BPM | SYSTOLIC BLOOD PRESSURE: 136 MMHG | DIASTOLIC BLOOD PRESSURE: 78 MMHG | WEIGHT: 189.56 LBS | HEIGHT: 62 IN | BODY MASS INDEX: 34.88 KG/M2

## 2024-07-22 DIAGNOSIS — E66.9 OBESITY (BMI 30-39.9): ICD-10-CM

## 2024-07-22 DIAGNOSIS — E78.5 DYSLIPIDEMIA: Primary | ICD-10-CM

## 2024-07-22 DIAGNOSIS — F43.9 STRESS: ICD-10-CM

## 2024-07-22 RX ORDER — PHENTERMINE HYDROCHLORIDE 15 MG/1
15 CAPSULE ORAL EVERY MORNING
Qty: 30 CAPSULE | Refills: 5 | Status: SHIPPED | OUTPATIENT
Start: 2024-07-22

## 2024-07-22 NOTE — PROGRESS NOTES
[facility]  [address] [facility] [address] [place] [id number]  Dept: [phone]       Patient:  Lashae Thomas  :      1970  MRN:      [id number]    Chief Complaint:    Chief Complaint   Patient presents with    Follow - Up    Weight Management     Weight check        SUBJECTIVE     History of Present Illness:  Lashae is being seen today for a follow-up for non surgical weight loss    Past Medical History:   Past Medical History:    Lipid screening    Obesity (BMI 30-39.9)    Prediabetes    on metformin x 3 months    Primary hypothyroidism    synthroid 75 mcg        Comorbidities:  Back pain-Improvement?  yes, Joint pain-Improvement?  yes, MEL-Improvement?  yes, and Snoring-Improvement?  yes    OBJECTIVE     Vitals: /78 (BP Location: Right arm, Patient Position: Sitting, Cuff Size: large)   Pulse 77   Ht 5' 2\" (1.575 m)   Wt 189 lb 9 oz (86 kg)   LMP 2023 (Exact Date)   SpO2 96%   BMI 34.67 kg/m²     Initial weight loss: +01   Total weight loss: +01    Start weight: 188    Wt Readings from Last 3 Encounters:   24 189 lb 9 oz (86 kg)   24 190 lb 3.2 oz (86.3 kg)   24 188 lb (85.3 kg)       Patient Medications:    Current Outpatient Medications   Medication Sig Dispense Refill    levothyroxine 175 MCG Oral Tab Take 1 tablet (175 mcg total) by mouth before breakfast. 90 tablet 0    ergocalciferol 1.25 MG (92433 UT) Oral Cap Take 1 capsule (50,000 Units total) by mouth once a week for 56 days, THEN 1 capsule (50,000 Units total) every 30 (thirty) days. 9 capsule 0    Phentermine HCl 15 MG Oral Cap Take 1 capsule (15 mg total) by mouth every morning. 30 capsule 5     Allergies:  Patient has no known allergies.     Social History:    Social History     Socioeconomic History    Marital status:      Spouse name: Not on file    Number of children: Not on file    Years of education: Not on file    Highest  education level: Not on file   Occupational History    Not on file   Tobacco Use    Smoking status: Former     Current packs/day: 0.00     Types: Cigarettes     Quit date: 3/30/2020     Years since quittin.3    Smokeless tobacco: Never    Tobacco comments:     1-2 cigs daily   Substance and Sexual Activity    Alcohol use: Yes     Alcohol/week: 1.0 standard drink of alcohol     Types: 1 Standard drinks or equivalent per week    Drug use: No    Sexual activity: Yes     Birth control/protection: Paragard   Other Topics Concern     Service Not Asked    Blood Transfusions Not Asked    Caffeine Concern No    Occupational Exposure Not Asked    Hobby Hazards Not Asked    Sleep Concern Not Asked    Stress Concern Not Asked    Weight Concern Not Asked    Special Diet Not Asked    Back Care Not Asked    Exercise Not Asked    Bike Helmet Not Asked    Seat Belt Not Asked    Self-Exams Not Asked   Social History Narrative    Not on file     Social Determinants of Health     Financial Resource Strain: Not on file   Food Insecurity: Not on file   Transportation Needs: Not on file   Physical Activity: Not on file   Stress: Not on file   Social Connections: Not on file   Housing Stability: Not on file     Surgical History:  History reviewed. No pertinent surgical history.  Family History:    Family History   Problem Relation Age of Onset    Alcohol and Other Disorders Associated Father         alcoholism/cirrhosis    Fibromyalgia Sister     Breast Cancer Neg     Ovarian Cancer Neg        Food Journal  Reviewed and Discussed:       Patient has a Food Journal?: yes   Patient is reading nutrition labels?  yes  Average Caloric Intake:     Average CHO Intake: 160  Is patient exercising? no  Type of exercise?     Eating Habits  Patient states the following:  Eats 2 meal(s) per day  Length of time it takes to consume a meal:  20  # of snacks per day: 1 Type of snacks:    Amount of soda consumption per day:    Amount of water (in  ounces) per day:  64  Drinking between meals only:  yes  Toughest challenge:  exercise    Nutritional Goals  Limit carbohydrates to 100 gms per day, Eat 100-200 calories within 1 hour of waking , and Eat 3-4 cups of fresh fruits or vegetables daily    Behavior Modifications Reviewed and Discussed  Eat breakfast, Eat 3 meals per day, Plan meals in advance, Read nutrition labels, Drink 64 oz of water per day, Maintain a daily food journal, No drinking 30 minutes before or after meals, Utlize portion control strategies to reduce calorie intake, Identify triggers for eating and manage cues, and Eat slowly and take 20 to 30 minutes to complete each meal    Exercise Goals Reviewed and Discussed        ROS:    Constitutional: negative  Respiratory: negative  Cardiovascular: negative  Gastrointestinal: negative  Musculoskeletal:negative  Neurological: negative  Behavioral/Psych: negative  Endocrine: negative  All other systems were reviewed and are negative    Physical Exam:   General appearance: alert, appears stated age, cooperative, and mildly obese  Head: Normocephalic, without obvious abnormality, atraumatic  Back: symmetric, no curvature. ROM normal. No CVA tenderness.  Lungs: clear to auscultation bilaterally  Heart: S1, S2 normal, no murmur, click, rub or gallop, regular rate and rhythm  Abdomen:  soft, obese, non tender  Extremities: extremities normal, atraumatic, no cyanosis or edema  Pulses: 2+ and symmetric  Skin: Skin color, texture, turgor normal. No rashes or lesions  Neurologic: Grossly normal    ASSESSMENT     HYPERCHOLESTEROLEMIA:  The patient states that her cholesterol has been well controlled on her current medication.    Lab Results   Component Value Date/Time    CHOLEST 206 (H) 01/18/2023 11:48 AM     (H) 01/18/2023 11:48 AM    HDL 52 01/18/2023 11:48 AM    TRIG 70 01/18/2023 11:48 AM    VLDL 13 01/18/2023 11:48 AM       Encounter Diagnosis(ses):   Encounter Diagnoses   Name Primary?     Dyslipidemia Yes    Stress     Obesity (BMI 30-39.9)        PLAN     Patient is not interested in bariatric surgery. Patient desires to pursue traditional weight loss at this time.      DYSLIPIDEMIA: Stable on the above prescribed meal plan and medication. Liver function stable.    Lab Results   Component Value Date/Time    CHOLEST 206 (H) 01/18/2023 11:48 AM     (H) 01/18/2023 11:48 AM    HDL 52 01/18/2023 11:48 AM    TRIG 70 01/18/2023 11:48 AM    VLDL 13 01/18/2023 11:48 AM       Stress: increased    Goals for next month:  1. Keep a food log.  2. Drink 48-64 ounces of non-caloric beverages per day. No fruit juices or regular soda.  3. Increase activity-upper body exercises, walk 10 minutes per day.  4. Increase fruit and vegetable servings to 5-6 per day.      Needs to start moving     in ICU  Increased stress noted    Tolerating Phentermine  Refill at current dose  EKG done    Will start Wegovy  May start Semiglutide if not covered    Diagnoses and all orders for this visit:    Dyslipidemia    Stress    Obesity (BMI 30-39.9)          Jaskaran Lopes MD

## 2024-07-23 ENCOUNTER — TELEPHONE (OUTPATIENT)
Dept: SURGERY | Facility: CLINIC | Age: 54
End: 2024-07-23

## 2024-09-30 RX ORDER — LEVOTHYROXINE SODIUM 175 UG/1
175 TABLET ORAL
Qty: 90 TABLET | Refills: 0 | Status: SHIPPED | OUTPATIENT
Start: 2024-09-30

## 2024-10-18 ENCOUNTER — APPOINTMENT (OUTPATIENT)
Dept: GENERAL RADIOLOGY | Facility: HOSPITAL | Age: 54
End: 2024-10-18
Payer: OTHER MISCELLANEOUS

## 2024-10-18 ENCOUNTER — HOSPITAL ENCOUNTER (EMERGENCY)
Facility: HOSPITAL | Age: 54
Discharge: HOME | End: 2024-10-18
Attending: EMERGENCY MEDICINE
Payer: OTHER MISCELLANEOUS

## 2024-10-18 VITALS
HEART RATE: 68 BPM | TEMPERATURE: 98 F | SYSTOLIC BLOOD PRESSURE: 115 MMHG | HEIGHT: 62.5 IN | RESPIRATION RATE: 16 BRPM | DIASTOLIC BLOOD PRESSURE: 61 MMHG | OXYGEN SATURATION: 96 % | WEIGHT: 179 LBS | BODY MASS INDEX: 32.12 KG/M2

## 2024-10-18 DIAGNOSIS — S93.402A MODERATE LEFT ANKLE SPRAIN, INITIAL ENCOUNTER: Primary | ICD-10-CM

## 2024-10-18 PROCEDURE — 73610 X-RAY EXAM OF ANKLE: CPT | Performed by: EMERGENCY MEDICINE

## 2024-10-18 PROCEDURE — 99284 EMERGENCY DEPT VISIT MOD MDM: CPT

## 2024-10-18 RX ORDER — HYDROCODONE BITARTRATE AND ACETAMINOPHEN 5; 325 MG/1; MG/1
1 TABLET ORAL ONCE
Status: COMPLETED | OUTPATIENT
Start: 2024-10-18 | End: 2024-10-18

## 2024-10-18 RX ORDER — IBUPROFEN 600 MG/1
600 TABLET, FILM COATED ORAL ONCE
Status: COMPLETED | OUTPATIENT
Start: 2024-10-18 | End: 2024-10-18

## 2024-10-18 RX ORDER — HYDROCODONE BITARTRATE AND ACETAMINOPHEN 5; 325 MG/1; MG/1
1 TABLET ORAL EVERY 6 HOURS PRN
Qty: 10 TABLET | Refills: 0 | Status: SHIPPED | OUTPATIENT
Start: 2024-10-18

## 2024-10-18 RX ORDER — IBUPROFEN 600 MG/1
600 TABLET, FILM COATED ORAL EVERY 8 HOURS PRN
Qty: 15 TABLET | Refills: 0 | Status: SHIPPED | OUTPATIENT
Start: 2024-10-18 | End: 2024-10-23

## 2024-10-18 NOTE — ED INITIAL ASSESSMENT (HPI)
Pt in wheel chair through triage c/o mechanical fall w/ L ankle injury today while going down stairs. CMS intact.

## 2024-10-18 NOTE — ED PROVIDER NOTES
Patient Seen in: [facility] Emergency Department      History     Chief Complaint   Patient presents with    Ankle Injury     Stated Complaint: left ankle injury s/p mechanical fall    Subjective:   HPI      54-year-old female with prior right ankle sprains who presents with left lateral ankle pain and and pain with any weightbearing status after she was coming down the stairs and inadvertently sprained her left ankle by inversion injury by description on the last stair.  She denies proximal fibular pain.  No paresthesias in the foot.  She has not taken anything for pain.  She is accompanied here by her .  She is not driving.    Objective:     Past Medical History:    Lipid screening    Obesity (BMI 30-39.9)    Prediabetes    on metformin x 3 months    Primary hypothyroidism    synthroid 75 mcg              History reviewed. No pertinent surgical history.             Social History     Socioeconomic History    Marital status:    Tobacco Use    Smoking status: Former     Current packs/day: 0.00     Types: Cigarettes     Quit date: 3/30/2020     Years since quittin.5    Smokeless tobacco: Never    Tobacco comments:     1-2 cigs daily   Substance and Sexual Activity    Alcohol use: Yes     Alcohol/week: 1.0 standard drink of alcohol     Types: 1 Standard drinks or equivalent per week    Drug use: No    Sexual activity: Yes     Birth control/protection: Paragard   Other Topics Concern    Caffeine Concern No                  Physical Exam     ED Triage Vitals [10/18/24 1618]   /80   Pulse 78   Resp 18   Temp 98.1 °F (36.7 °C)   Temp src Oral   SpO2 97 %   O2 Device None (Room air)       Current Vitals:   Vital Signs  BP: 131/80  Pulse: 78  Resp: 18  Temp: 98.1 °F (36.7 °C)  Temp src: Oral    Oxygen Therapy  SpO2: 97 %  O2 Device: None (Room air)        Physical Exam  Constitutional: Oriented to person, place, and time.  Appears well-developed. No distress.   Head: Normocephalic and  atraumatic.   Eyes: Conjunctivae are normal. Pupils are equal, round, and reactive to light.   Cardiovascular: Normal rate, regular rhythm and intact distal pulses.    Musculoskeletal: Moderate swelling some mild ecchymosis pain to palpation over the right lateral malleolus diffusely.  Mild medial pain.  Achilles tendon intact.  No foot injury.  Dorsalis pedis pulse strong.  Motor and sensory in the foot.  Lower leg compartments are soft.  No high fibular pain.  Neurological: Alert and oriented to person, place, and time.   Skin: Skin is warm and dry.   Nursing note and vitals reviewed.    Differential diagnosis includes left lateral ankle sprain or distal fibula fracture.      ED Course   Labs Reviewed - No data to display         XR ANKLE (MIN 3 VIEWS), LEFT (CPT=73610)    Result Date: 10/18/2024  PROCEDURE: XR ANKLE (MIN 3 VIEWS), LEFT (CPT=73610)  COMPARISON: None.  INDICATIONS: Left ankle injury today post fall. More pain located on lateral aspect of ankle.  TECHNIQUE: 3 views (AP, mortise, and lateral projections) were obtained.   FINDINGS:  BONES: Calcaneal spur at the insertion of the plantar fascia.  Otherwise, no significant arthropathy, fracture, or acute abnormality.  The tibiotalar joint space is maintained on the mortise projection. SOFT TISSUES: There is soft tissue swelling most prominent over the malleoli more prominent over the lateral malleolus. EFFUSION: There is a small ankle effusion. OTHER: Negative.          CONCLUSION:  Soft tissue swelling with ankle effusion. No definite osseous abnormality. Findings are compatible with acute left ankle sprain.  Dictated by (CST): Rigoberto Cope MD on 10/18/2024 at 5:23 PM     Finalized by (CST): Rigoberto Cope MD on 10/18/2024 at 5:38 PM                [facility]              Medical Decision Making  Patient placed in an [employer].  She has crutches.   is driving.  Recommended ice and elevation and prescribed therapy.  She can alternatively do  over-the-counter anti-inflammatories or Tylenol.  [facility] any worsening or change.    Problems Addressed:  Moderate left ankle sprain, initial encounter: acute illness or injury    Amount and/or Complexity of Data Reviewed  Radiology: ordered and independent interpretation performed. Decision-making details documented in ED Course.     Details: By my gross review of the left ankle x-ray did not appreciate gross and obvious evidence of fracture or bony malalignment    Risk  OTC drugs.  Prescription drug management.        Disposition and Plan     Clinical Impression:  1. Moderate left ankle sprain, initial encounter         Disposition:  Discharge  10/18/2024  6:15 pm    Follow-up:  Carlos Snyder MD  [address] [facility] [address]  [facility] [id number] [phone]    Call  As needed    Johnson Stovall MD  [address] [facility] [address]  [facility] [id number] [phone]    Schedule an appointment as soon as possible for a visit in 1 week(s)  As needed          Medications Prescribed:  Current Discharge Medication List        START taking these medications    Details   ibuprofen 600 MG Oral Tab Take 1 tablet (600 mg total) by mouth every 8 (eight) hours as needed for Pain or Fever.  Qty: 15 tablet, Refills: 0    Associated Diagnoses: Moderate left ankle sprain, initial encounter      HYDROcodone-acetaminophen 5-325 MG Oral Tab Take 1 tablet by mouth every 6 (six) hours as needed.  Qty: 10 tablet, Refills: 0    Associated Diagnoses: Moderate left ankle sprain, initial encounter                 Supplementary Documentation:

## 2024-11-19 ENCOUNTER — OFFICE VISIT (OUTPATIENT)
Dept: ORTHOPEDICS CLINIC | Facility: CLINIC | Age: 54
End: 2024-11-19

## 2024-11-19 DIAGNOSIS — S93.492A SPRAIN OF ANTERIOR TALOFIBULAR LIGAMENT OF LEFT ANKLE, INITIAL ENCOUNTER: Primary | ICD-10-CM

## 2024-11-19 RX ORDER — MELOXICAM 15 MG/1
15 TABLET ORAL DAILY
Qty: 30 TABLET | Refills: 0 | Status: SHIPPED | OUTPATIENT
Start: 2024-11-19

## 2024-11-19 NOTE — H&P
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 On 10/18/2024 was walking down the stairs to the first floor and miss step; she fell down landing on right knee and twist left ankle. Has XR in Epic.        HISTORY OF PRESENT ILLNESS: Lashae Thomas is a 54 year old female who presents to the clinic for evaluation of her left ankle.  She reports that on , she was coming down a staircase at work and missed a step, falling down onto the landing and twisting her left ankle.  She reports that since that time, she has had pain in her left ankle.  It has somewhat improved but she continues to have pain that primarily affects her at the end of the day and into the night.  She also has swelling but flares up in the evenings.  She has been ambulating in normal footwear.  She has been doing home exercises as advised by a friend who is a chiropractor.  She is taking occasional anti-inflammatories for pain.  Pain is currently mild, 2-4 out of 10.  She denies a history of ankle instability events    SOCIAL HISTORY  Social History     Socioeconomic History    Marital status:      Spouse name: Not on file    Number of children: Not on file    Years of education: Not on file    Highest education level: Not on file   Occupational History    Not on file   Tobacco Use    Smoking status: Former     Current packs/day: 0.00     Types: Cigarettes     Quit date: 3/30/2020     Years since quittin.6    Smokeless tobacco: Never    Tobacco comments:     1-2 cigs daily   Substance and Sexual Activity    Alcohol use: Yes     Alcohol/week: 1.0 standard drink of alcohol      Types: 1 Standard drinks or equivalent per week    Drug use: No    Sexual activity: Yes     Birth control/protection: Paragard   Other Topics Concern     Service Not Asked    Blood Transfusions Not Asked    Caffeine Concern No    Occupational Exposure Not Asked    Hobby Hazards Not Asked    Sleep Concern Not Asked    Stress Concern Not Asked    Weight Concern Not Asked    Special Diet Not Asked    Back Care Not Asked    Exercise Not Asked    Bike Helmet Not Asked    Seat Belt Not Asked    Self-Exams Not Asked   Social History Narrative    Not on file     Social Drivers of Health     Financial Resource Strain: Not on file   Food Insecurity: Not on file   Transportation Needs: Not on file   Physical Activity: Not on file   Stress: Not on file   Social Connections: Not on file   Housing Stability: Not on file        PAST MEDICAL HISTORY  Past Medical History:    Lipid screening    Obesity (BMI 30-39.9)    Prediabetes    on metformin x 3 months    Primary hypothyroidism    synthroid 75 mcg        PAST SURGICAL HISTORY  History reviewed. No pertinent surgical history.     MEDICATIONS  * Reviewed   Meloxicam 15 MG Oral Tab Take 1 tablet (15 mg total) by mouth daily. 30 tablet 0    LEVOTHYROXINE 175 MCG Oral Tab TAKE 1 TABLET BY MOUTH ONCE A DAY BEFORE BREAKFAST 90 tablet 0    Phentermine HCl 15 MG Oral Cap Take 1 capsule (15 mg total) by mouth every morning. 30 capsule 5      PARAGARD INTRAUTERINE COPPER IUD 1 Device  1 Device Intrauterine Once Cornelia Zabala MD            ALLERGIES  Allergies[1]     FAMILY HISTORY  Family History   Problem Relation Age of Onset    Alcohol and Other Disorders Associated Father         alcoholism/cirrhosis    Fibromyalgia Sister     Breast Cancer Neg     Ovarian Cancer Neg         REVIEW OF SYSTEMS  A 14 point review of systems was performed. Pertinent positives and negatives noted in the HPI.    PHYSICAL EXAM  LMP 12/14/2023 (Exact Date)      Constitutional: The patient is  well-developed, well-nourished, in no acute distress.  Neurological: Alert and oriented to person, place, and time.  Psychiatric: Mood and affect normal.  Head: Normocephalic and atraumatic.  Cardiovascular: regular rate by palpation  Pulmonary/Chest: Effort normal. No respiratory distress. Breathing non-labored  Abdominal: Abdomen exhibits no distension.   Left  FOOT/ANKLE  INSPECTION/PALPATION  Mild  swelling about ankle   No ecchymosis about medial and lateral ankle  No breaks in skin  TTP over ATFL  MOTOR/STRENGTH  Intact EHL/FHL/TA/GSC/peroneals  Able to move all toes actively  STABILITY  Unstable anterior drawer  Stable talar tilt  Negative external rotation stress  Negative syndesmotic squeeze  SPECIAL TESTS  Negative Mullins squeeze  Negative Peroneal tendon subluxation  SILT Linda/Saph/SPN/DPN/T  2+ DP/PT pulse, brisk capillary refill, foot warm & well perfused      RESULTS    Lab Results   Component Value Date    WBC 6.4 01/18/2023    HGB 13.8 01/18/2023    .0 01/18/2023      Lab Results   Component Value Date     (H) 01/18/2023    BUN 11 01/18/2023    CREATSERUM 0.69 01/18/2023    GFRNAA > 60 05/03/2014    GFRAA > 60 05/03/2014        IMAGING  I independently viewed and interpreted the imaging. Radiologist interpretation is available in the imaging report.  X-ray: Plain films of the left ankle including AP, lateral, and oblique views were reviewed. These demonstrate no acute osseous abnormalities.  The talus is centrally positioned within the ankle mortise with no clear space widening.  There are minimal to no degenerative changes in the tibiotalar joint.  There are minimal to no degenerative changes in the imaged midfoot.  There are no  loose bodies evident.  There isno evidence of talar dome lesions or other fractures.     ASSESSMENT/PLAN: Lashae Thomas is a 54 year old female who presents to the Orthopaedic surgery clinic today for left ankle sprain.  We discussed this injury  pattern including the natural history and treatment options.  We discussed that typically this will continue to improve over the first 2 to 3 months after the injury.  We also discussed that recurrence can be prevented to some extent by working with physical therapy on proprioception and gait training.  We discussed the role of anti-inflammatories, elevation, and icing as needed.  We also discussed the utility of stable footwear.  At this point, she will continue elevation, anti-inflammatories, and cryotherapy and she will be initiated on a course of therapy for ankle stabilization.  We will plan on following up in approximately 8 weeks if pain persists    Discussed the history, physical exam, treatment to date, and reviewed relevant imaging an studies with the patient.  WEIGHT BEARING STATUS: Weightbearing as tolerated  RANGE-OF-MOTION LIMITATIONS: as tolerated  NEW PRESCRIPTIONS:  We discussed medications for this condition including patient current regimen. Based on this discussion we have added/re-ordered meloxicam  IMAGING ORDERED: none  CONSULTS PLACED: We discussed the role of therapy and/or additional specialty evaluation/intervention for this condition including previous/ongoing therapy and specialist services. Based on this discussion we have consulted physical therapy  PROSTHESES/ORTHOTICS: the patient does not need prostheses/orthoses for the current condition  PROCEDURES: none    FOLLOW-UP: as needed    RADIOGRAPHS AT NEXT VISIT: none    I have personally seen Lashae Thomas and discussed in detail their plan of care. Prior to departure, they indicated agreement with and understanding of their plan of care and their follow-up as documented herein this note. Please note that this note was written in combination with voice recognition/dictation software and there is a possibility of transcription errors which were not identified at the time of note submission. If clarification is necessary,  please contact the author or clinic staff.    Johnson Stovall MD  Orthopaedic Surgery  11/19/2024         [1] No Known Allergies

## 2024-12-30 ENCOUNTER — TELEPHONE (OUTPATIENT)
Dept: SURGERY | Facility: CLINIC | Age: 54
End: 2024-12-30

## 2024-12-30 NOTE — TELEPHONE ENCOUNTER
Called pt explaining that we need to make her appt virtual, but that we will be cx her appt because she hasn't responded to our request to insurance info needed.

## 2024-12-31 ENCOUNTER — TELEMEDICINE (OUTPATIENT)
Dept: SURGERY | Facility: CLINIC | Age: 54
End: 2024-12-31
Payer: COMMERCIAL

## 2024-12-31 VITALS — HEIGHT: 62.5 IN | BODY MASS INDEX: 33.01 KG/M2 | WEIGHT: 184 LBS

## 2024-12-31 DIAGNOSIS — E66.9 OBESITY (BMI 30-39.9): ICD-10-CM

## 2024-12-31 DIAGNOSIS — E78.5 DYSLIPIDEMIA: Primary | ICD-10-CM

## 2024-12-31 DIAGNOSIS — F43.9 STRESS: ICD-10-CM

## 2024-12-31 PROCEDURE — 99213 OFFICE O/P EST LOW 20 MIN: CPT | Performed by: INTERNAL MEDICINE

## 2024-12-31 RX ORDER — PHENTERMINE HYDROCHLORIDE 15 MG/1
15 CAPSULE ORAL EVERY MORNING
Qty: 30 CAPSULE | Refills: 5 | Status: SHIPPED | OUTPATIENT
Start: 2024-12-31

## 2024-12-31 NOTE — PROGRESS NOTES
[facility], [facility], [place]  [address]  [place] [facility] [address]  Dept: [phone]     Virtual video Check-In    Lashae Thomas verbally consents to a Virtual/Telephone Check-In visit on 24.  Patient has been referred to the [facility] website at [email or web address] to review the yearly Consent to Treat document.    Patient understands and accepts financial responsibility for any deductible, co-insurance and/or co-pays associated with this service.    Duration of the service: 22 minutes      Video visit          Patient:  Lashae Thomas  :      1970  MRN:      [id number]    Chief Complaint:    Chief Complaint   Patient presents with    Follow - Up     Non surgical weight loss. Video visit       SUBJECTIVE     History of Present Illness:  Lashae is being seen today for a follow-up for non surgical weight loss    Past Medical History:   Past Medical History:    Lipid screening    Obesity (BMI 30-39.9)    Prediabetes    on metformin x 3 months    Primary hypothyroidism    synthroid 75 mcg        Comorbidities:  Back pain-Improvement?  yes, Joint pain-Improvement?  yes, MEL-Improvement?  yes, and Snoring-Improvement?  yes    OBJECTIVE     Vitals: Ht 5' 2.5\" (1.588 m)   Wt 184 lb (83.5 kg)   LMP 2023 (Exact Date)   BMI 33.12 kg/m²     Initial weight loss: -05   Total weight loss: -04   Start weight: 188    Wt Readings from Last 3 Encounters:   24 184 lb (83.5 kg)   10/18/24 179 lb (81.2 kg)   24 189 lb 9 oz (86 kg)       Patient Medications:    Current Outpatient Medications   Medication Sig Dispense Refill    Meloxicam 15 MG Oral Tab Take 1 tablet (15 mg total) by mouth daily. 30 tablet 0    HYDROcodone-acetaminophen 5-325 MG Oral Tab Take 1 tablet by mouth every 6 (six) hours as needed. (Patient not taking: Reported on 2024) 10 tablet 0    LEVOTHYROXINE 175 MCG Oral Tab TAKE 1 TABLET BY MOUTH ONCE A DAY  BEFORE BREAKFAST 90 tablet 0    Phentermine HCl 15 MG Oral Cap Take 1 capsule (15 mg total) by mouth every morning. 30 capsule 5     Allergies:  Patient has no known allergies.     Social History:    Social History     Socioeconomic History    Marital status:      Spouse name: Not on file    Number of children: Not on file    Years of education: Not on file    Highest education level: Not on file   Occupational History    Not on file   Tobacco Use    Smoking status: Former     Current packs/day: 0.00     Types: Cigarettes     Quit date: 3/30/2020     Years since quittin.7    Smokeless tobacco: Never    Tobacco comments:     1-2 cigs daily   Substance and Sexual Activity    Alcohol use: Yes     Alcohol/week: 1.0 standard drink of alcohol     Types: 1 Standard drinks or equivalent per week    Drug use: No    Sexual activity: Yes     Birth control/protection: Paragard   Other Topics Concern     Service Not Asked    Blood Transfusions Not Asked    Caffeine Concern No    Occupational Exposure Not Asked    Hobby Hazards Not Asked    Sleep Concern Not Asked    Stress Concern Not Asked    Weight Concern Not Asked    Special Diet Not Asked    Back Care Not Asked    Exercise Not Asked    Bike Helmet Not Asked    Seat Belt Not Asked    Self-Exams Not Asked   Social History Narrative    Not on file     Social Drivers of Health     Financial Resource Strain: Not on file   Food Insecurity: Not on file   Transportation Needs: Not on file   Physical Activity: Not on file   Stress: Not on file   Social Connections: Not on file   Housing Stability: Not on file     Surgical History:  No past surgical history on file.  Family History:    Family History   Problem Relation Age of Onset    Alcohol and Other Disorders Associated Father         alcoholism/cirrhosis    Fibromyalgia Sister     Breast Cancer Neg     Ovarian Cancer Neg        Food Journal  Reviewed and Discussed:       Patient has a Food Journal?: yes    Patient is reading nutrition labels?  yes  Average Caloric Intake:     Average CHO Intake: 160  Is patient exercising? no  Type of exercise?     Eating Habits  Patient states the following:  Eats 2 meal(s) per day  Length of time it takes to consume a meal:  20  # of snacks per day: 1 Type of snacks:    Amount of soda consumption per day:    Amount of water (in ounces) per day:  64  Drinking between meals only:  yes  Toughest challenge:  exercise    Nutritional Goals  Limit carbohydrates to 100 gms per day, Eat 100-200 calories within 1 hour of waking , and Eat 3-4 cups of fresh fruits or vegetables daily    Behavior Modifications Reviewed and Discussed  Eat breakfast, Eat 3 meals per day, Plan meals in advance, Read nutrition labels, Drink 64 oz of water per day, Maintain a daily food journal, No drinking 30 minutes before or after meals, Utlize portion control strategies to reduce calorie intake, Identify triggers for eating and manage cues, and Eat slowly and take 20 to 30 minutes to complete each meal    Exercise Goals Reviewed and Discussed        ROS:    Constitutional: negative  Respiratory: negative  Cardiovascular: negative  Gastrointestinal: negative  Musculoskeletal:negative  Neurological: negative  Behavioral/Psych: negative  Endocrine: negative  All other systems were reviewed and are negative    Physical Exam:   General appearance: alert, appears stated age, cooperative, and mildly obese  Head: Normocephalic, without obvious abnormality, atraumatic  Back: symmetric, no curvature. ROM normal. No CVA tenderness.  Lungs: clear to auscultation bilaterally  Heart: S1, S2 normal, no murmur, click, rub or gallop, regular rate and rhythm  Abdomen:  soft, obese, non tender  Extremities: extremities normal, atraumatic, no cyanosis or edema  Pulses: 2+ and symmetric  Skin: Skin color, texture, turgor normal. No rashes or lesions  Neurologic: Grossly normal    ASSESSMENT     HYPERCHOLESTEROLEMIA:  The patient  states that her cholesterol has been well controlled on her current medication.    Lab Results   Component Value Date/Time    CHOLEST 206 (H) 01/18/2023 11:48 AM     (H) 01/18/2023 11:48 AM    HDL 52 01/18/2023 11:48 AM    TRIG 70 01/18/2023 11:48 AM    VLDL 13 01/18/2023 11:48 AM       Encounter Diagnosis(ses):   Encounter Diagnoses   Name Primary?    Dyslipidemia Yes    Stress     Obesity (BMI 30-39.9)        PLAN     Patient is not interested in bariatric surgery. Patient desires to pursue traditional weight loss at this time.      DYSLIPIDEMIA: Stable on the above prescribed meal plan and medication. Liver function stable.    Lab Results   Component Value Date/Time    CHOLEST 206 (H) 01/18/2023 11:48 AM     (H) 01/18/2023 11:48 AM    HDL 52 01/18/2023 11:48 AM    TRIG 70 01/18/2023 11:48 AM    VLDL 13 01/18/2023 11:48 AM       Stress: increased    Goals for next month:  1. Keep a food log.  2. Drink 48-64 ounces of non-caloric beverages per day. No fruit juices or regular soda.  3. Increase activity-upper body exercises, walk 10 minutes per day.  4. Increase fruit and vegetable servings to 5-6 per day.      Needs to start moving    Increased stress noted    Tolerating Phentermine  Refill at current dose  EKG done    GLP not covered  May benefit from Semiglutide via [employer]     Diagnoses and all orders for this visit:    Dyslipidemia    Stress    Obesity (BMI 30-39.9)          Jaskaran Lopes MD

## 2025-02-25 DIAGNOSIS — E66.9 OBESITY (BMI 30-39.9): Primary | ICD-10-CM

## 2025-03-11 NOTE — TELEPHONE ENCOUNTER
Endocrine refill protocol for medications for hypothyroidism and hyperthyroidism    Protocol Criteria:  FAILED Reason: No Visit in required time frame and Abnormal labs    If all below requirements are met, send a 90-day supply with 1 refill per provider protocol.    Verify appointment with Endocrinology completed in the last 12 months or scheduled in the next 6 months.    Normal TSH result in the past 12 months   Review recent telephone encounters and mychart communications with patient to ensure a dose change has not occurred since last office visit that was not updated in the medication history list     Last completed office visit:6/14/2024 Latoya Anderson MD   Next scheduled Follow up: No future appointments. Mcm sent  Last TSH result:   TSH   Date Value Ref Range Status   06/14/2024 7.286 (H) 0.550 - 4.780 mIU/mL Final     TSH (S)   Date Value Ref Range Status   05/03/2014 0.73 0.34 - 5.60 uIU/mL Final

## 2025-03-14 RX ORDER — LEVOTHYROXINE SODIUM 175 UG/1
175 TABLET ORAL
Qty: 90 TABLET | Refills: 0 | Status: SHIPPED | OUTPATIENT
Start: 2025-03-14

## 2025-04-12 ENCOUNTER — HOSPITAL ENCOUNTER (EMERGENCY)
Facility: HOSPITAL | Age: 55
Discharge: LEFT BEFORE DISCHARGE | End: 2025-04-12

## 2025-04-12 VITALS
TEMPERATURE: 98 F | OXYGEN SATURATION: 96 % | RESPIRATION RATE: 16 BRPM | SYSTOLIC BLOOD PRESSURE: 135 MMHG | HEART RATE: 71 BPM | DIASTOLIC BLOOD PRESSURE: 85 MMHG

## 2025-04-13 NOTE — ED INITIAL ASSESSMENT (HPI)
Pt arrives ambulatory to ED for laceration to R thumb via Mandolin approx. 30-45 min Pta. Aox4, speaking in full sentences. Unknown last Tdap.

## 2025-04-14 ENCOUNTER — TELEPHONE (OUTPATIENT)
Dept: FAMILY MEDICINE CLINIC | Facility: CLINIC | Age: 55
End: 2025-04-14

## 2025-04-14 NOTE — TELEPHONE ENCOUNTER
Patient (name and  verified) call for follow up. Patient states that she went to the ER over the weekend for a laceration however the ER was so busy she left and was not seen. Patient states that she has been cleaning the wound on her finger on her own and denies any concerns with infection at this time. Patient wanted to know when her last tetanus shot was, noted in chart it was . Advised to call back with any new concerns. Verbalized understanding.

## 2025-06-06 NOTE — PROGRESS NOTES
Subjective:   Lashae Thomas is a 54 year old female who presents for Establish Care     Patient presents to establish care and discuss IUD options. In perimenopause. Unsure if in the future will pursue HRT. Has copper IUD in place. Last placed 10 years ago.    Has not yet had colon cancer screening. No family hx of colon cancer.      History/Other:    Chief Complaint Reviewed and Verified  No Further Nursing Notes to   Review  Tobacco Reviewed  Allergies Reviewed  Medications Reviewed    Problem List Reviewed  Medical History Reviewed  Surgical History   Reviewed  OB Status Reviewed  Family History Reviewed  Social History   Reviewed         Tobacco:  She smoked tobacco in the past but quit greater than 12 months ago.  Tobacco Use[1]     Current Medications[2]      Review of Systems:  Review of Systems   Constitutional: Negative.    HENT: Negative.     Eyes: Negative.    Respiratory: Negative.     Cardiovascular: Negative.    Gastrointestinal: Negative.    Genitourinary: Negative.    Musculoskeletal: Negative.    Skin: Negative.    Neurological: Negative.    Psychiatric/Behavioral: Negative.           Objective:   /82   Pulse 78   Ht 5' 2\" (1.575 m)   Wt 183 lb (83 kg)   SpO2 98%   BMI 33.47 kg/m²  Estimated body mass index is 33.47 kg/m² as calculated from the following:    Height as of this encounter: 5' 2\" (1.575 m).    Weight as of this encounter: 183 lb (83 kg).  Physical Exam  Vitals and nursing note reviewed.   Constitutional:       General: She is not in acute distress.     Appearance: Normal appearance. She is not ill-appearing.   HENT:      Head: Normocephalic and atraumatic.      Right Ear: Tympanic membrane normal. There is no impacted cerumen.      Left Ear: Tympanic membrane normal. There is no impacted cerumen.      Mouth/Throat:      Mouth: Mucous membranes are moist.      Pharynx: Oropharynx is clear. No oropharyngeal exudate or posterior oropharyngeal erythema.    Eyes:      General:         Right eye: No discharge.         Left eye: No discharge.      Extraocular Movements: Extraocular movements intact.      Pupils: Pupils are equal, round, and reactive to light.   Cardiovascular:      Rate and Rhythm: Normal rate and regular rhythm.      Heart sounds: Normal heart sounds. No murmur heard.     No friction rub. No gallop.   Pulmonary:      Effort: Pulmonary effort is normal.      Breath sounds: Normal breath sounds. No wheezing, rhonchi or rales.   Abdominal:      General: Abdomen is flat. Bowel sounds are normal. There is no distension.      Palpations: Abdomen is soft. There is no mass.      Tenderness: There is no abdominal tenderness. There is no guarding or rebound.   Musculoskeletal:         General: Normal range of motion.      Cervical back: Normal range of motion.      Right lower leg: No edema.      Left lower leg: No edema.   Skin:     General: Skin is warm and dry.      Findings: No rash.   Neurological:      General: No focal deficit present.      Mental Status: She is alert. Mental status is at baseline.   Psychiatric:         Mood and Affect: Mood normal.         Behavior: Behavior normal.           Assessment & Plan:   1. Encounter for general adult medical examination w/o abnormal findings (Primary)  -     Basic Metabolic Panel (8); Future; Expected date: 06/06/2025  -     Hemoglobin A1C; Future; Expected date: 06/06/2025  -     Lipid Panel; Future; Expected date: 06/06/2025  -     CBC With Differential With Platelet  -     TSH W Reflex To Free T4; Future; Expected date: 06/06/2025    Ordered annual labs    2. Encounter for counseling regarding contraception  Discussed with patient options of IUDs once comes back to remove ParaGard.  After discussion patient would like to proceed with Mirena IUD    3. Colon cancer screening  -     [facility] Telephone Colon Screen; Future; Expected date: 06/13/2025    Due ordered    4. Breast cancer screening by mammogram  -      [facility] MAGNOLIA 2D+3D SCREENING BILAT (CPT=77067/83836); Future; Expected date: 2025    Due ordered    5. Dense breast  -     US BREAST BILATERAL COMPLETE (CPT=76641-50); Future; Expected date: 2025    Noted on previous mammogram.  Ordered ultrasound    6. Need for vaccination  -     Prevnar 20 (PCV20) [63142]    Administered by staff    7. Vitamin D deficiency  -     Vitamin D; Future; Expected date: 2025    Known history of this.  Following with endocrinology.  Ordered updated vitamin D levels    8. Dyslipidemia  Following with Dr. Lopes of medical bariatrics for weight loss.    9. Hashimoto's disease    Known history of this.  Following with endocrinology.  Ordered updated thyroid levels    Return in about 14 weeks (around 2025) for IUD removal and replacement Mirena.    Kirsten Miguel MD, 2025, 8:22 AM          [1]   Social History  Tobacco Use   Smoking Status Former    Current packs/day: 0.00    Types: Cigarettes    Quit date: 3/30/2020    Years since quittin.1   Smokeless Tobacco Never   Tobacco Comments    1-2 cigs daily   [2]   Current Outpatient Medications   Medication Sig Dispense Refill    levothyroxine 175 MCG Oral Tab Take 1 tablet (175 mcg total) by mouth before breakfast. 90 tablet 0    CUSTOM MEDICATION Semaglutide/ cyanocobalamin    0.25 mg-   concentration: 1 /0.5 mg/ ML  Amount:  1 Ml vial  Instructions: inject 25 units/ 0.25 ML q weekly 1 each 5    Phentermine HCl 15 MG Oral Cap Take 1 capsule (15 mg total) by mouth every morning. 30 capsule 5

## 2025-06-06 NOTE — TELEPHONE ENCOUNTER
1ST  attempt to schedule telephone colon screening.     Left message to call back.     Plan to await call back or follow up.     [facility] Staff     Please schedule TCS appointment upon return call.     Thank you!

## 2025-06-11 NOTE — PROGRESS NOTES
Juliana ThomasRyan  verbally consents to a video visit on 6/11/2025     Patient understands and accepts financial responsibility for any deductible, co-insurance and/or co-pays associated with this service.  Patient has been referred to the [facility] website at [email or web address] to review the yearly Consent to Treat document.        This visit is conducted using Telemedicine with live, interactive video and audio    Return Office Visit     CHIEF COMPLAINT:    Hypothyroidism  Weight management      HISTORY OF PRESENT ILLNESS:  Lashae Thomas is a 54 year old female who presents for follow up for hypothyroidism and BMI > 30 .     She was diagnosed around age 44.     FH of thyroid disease: Cousin    She is on LT 4 175 mcg daily.   She reports compliance with medication  Takes it empty stomach    She is on phentermine-topiramate and ozempic 0.25 mg weekly --> follows with Dr. Lopes                CURRENT MEDICATION:    Current Outpatient Medications   Medication Sig Dispense Refill    ergocalciferol 1.25 MG (01396 UT) Oral Cap Take 1 capsule (50,000 Units total) by mouth once a week for 42 days, THEN 1 capsule (50,000 Units total) every 30 (thirty) days. 11 capsule 0    levothyroxine 175 MCG Oral Tab Take 1 tablet (175 mcg total) by mouth before breakfast. 90 tablet 0    CUSTOM MEDICATION Semaglutide/ cyanocobalamin    0.25 mg-   concentration: 1 /0.5 mg/ ML  Amount:  1 Ml vial  Instructions: inject 25 units/ 0.25 ML q weekly 1 each 5    Phentermine HCl 15 MG Oral Cap Take 1 capsule (15 mg total) by mouth every morning. 30 capsule 5         ALLERGY:  No Known Allergies    PAST MEDICAL, SOCIAL AND FAMILY HISTORY:  See past medical history marked as reviewed.  See past surgical history marked as reviewed.  See past family history marked as reviewed.  See past social history marked as reviewed.    ASSESSMENTS:       REVIEW OF SYSTEMS:  Constitutional: Negative for: fever, fatigue, cold/heat  intolerance,  weight change  Eyes: Negative for:  Visual changes, proptosis, blurring  ENT: Negative for:  dysphagia, neck swelling, dysphonia  Respiratory: Negative for:  dyspnea, cough  Cardiovascular: Negative for:  chest pain, palpitations, orthopnea  GI: Negative for:  abdominal pain, nausea, vomiting, diarrhea, constipation, bleeding  Neurology: Negative for: headache, numbness, weakness  Genito-Urinary: Negative for: dysuria, frequency  Psychiatric: Negative for:  depression, + anxiety, chronic better  Hematology/Lymphatics: Negative for: bruising, lower extremity edema  Endocrine: Negative for: polyuria, polydypsia  Skin: Negative for: rash, blister, cellulitis,       PHYSICAL EXAM:     Vitals reviewed    General Appearance:  alert, well developed, in no acute distress  Head: Atraumatic  Eyes:  normal conjunctivae, sclera., normal sclera and normal pupils  Throat/Neck: normal sound to voice. Normal hearing, normal speech  Respiratory:  Speaking in full sentences, non-labored. no increased work of breathing, no audible wheezing  , no significant thyroid enlargement palpated  Skin:  normal moisture and skin texture, no visible lesions  Hematologic:  no excessive bruising  Neuro: motor grossly intact, moving all extremities without difficulty  Psychiatric:  oriented to time, self, and place,+  Anxiety ( stable), no SI no HI  Extremities: no obvious extremity swelling, no lesions      DATA:     Reviewed pertinent labs    ASSESSMENT AND PLAN:      54 year old female with     1. Hypothyroidism  LT4 175 mcg daily  TSH normal   Discussed administration.   Discussed compliance , reviewed complications from uncontrolled hypothyroidism including myxedema coma         2. Vitamin D insufficiency   Vit D is 22  Start ergocalciferol 50,000 units weekly x 6 weeks, followed by once a month dosing   Recheck vitamin D in 3 months      3. Pre Diabetes: NEW DIAGNOSIS   I reviewed the following:   - Diagnosis of prediabetes is  based on the presence of impaired fasting glucose, impaired glucose tolerance, and/or elevated HbA1c levels between 5.7% and 6.4%  - The combination of diet and exercise is arguably the single most important factor that could halt the progression towards type 2 diabetes in patients with prediabetes.  - The first step in managing patients with prediabetes is to encourage strict lifestyle modifications consisting of >= 180 min of physical activity per week and a calorie intake of 1,200 to 1,800 kcal per day.  - If lifestyle changes fail, we can consider medications like metformin or GLP agonists    I reviewed dietary changes in detail   Reading material also provided  Off note she is also on low dose of ozempic ( following with Dr Lopes)         RTC in 6 months.   Call for results    Patient verbalized a complete  understanding of all of the above and did not have any further questions.                    Latoya Anderson MD    Please note that the following visit was completed using two-way, real-time interactive audio and video communication.  This has been done in good candis to provide continuity of care in the best interest of the provider-patient relationship.  There are limitations of this visit as no physical exam could be performed.  Every conscious effort was taken to allow for sufficient and adequate time.  This billing was spent on reviewing labs, medications, radiology tests and decision making.  Appropriate medical decision-making and tests are ordered as detailed in the plan of care above.”

## 2025-07-11 NOTE — TELEPHONE ENCOUNTER
Endocrine refill protocol for medications for hypothyroidism and hyperthyroidism    Protocol Criteria:  PASSED Reason: N/A    If all below requirements are met, send a 90-day supply with 1 refill per provider protocol.    Verify appointment with Endocrinology completed in the last 12 months or scheduled in the next 6 months.    Normal TSH result in the past 12 months   Review recent telephone encounters and mychart communications with patient to ensure a dose change has not occurred since last office visit that was not updated in the medication history list     Last completed office visit:Visit date not found   Last completed telemed visit: 6/11/2025 Latoya Anderson MD  Next scheduled Follow up:   Future Appointments   Date Time Provider Department Center   9/12/2025  8:00 AM Kirsten Miguel MD [facility]    9/15/2025  7:40 AM [address]   10/20/2025  3:30 PM Jaskaran Lopes MD [id number] [facility]      Last TSH result:   TSH   Date Value Ref Range Status   06/06/2025 1.049 0.550 - 4.780 uIU/mL Final     TSH (S)   Date Value Ref Range Status   05/03/2014 0.73 0.34 - 5.60 uIU/mL Final

## (undated) NOTE — MR AVS SNAPSHOT
[facility]  [address], [address]  [phone]               Thank you for choosing us for your health care visit with Jenna Chavis MD.  We are glad to serve you and happy to provide you with this summary [employer] will allow you to access patient instructions from your recent visit,  view other health information, and more. To sign up or find more information, go to [email or web address] and click on the Sign Up Now link in the [employer] box.      Enter 2 ½ hours per week – spread out over time Use a ousmane to keep you motivated   Don’t forget strength training with weights and resistance Set goals and track your progress   You don’t need to join a gym. Home exercises work great.  Put more priority on exe

## (undated) NOTE — LETTER
Date & Time: 11/29/2022, 1:41 PM  Patient: Kong Rasmussen  Encounter Provider(s):    UMBERTO Arrington       To Whom It May Concern:    Diana Bright was seen and treated in our department on 11/29/2022. She should be excused from work to return 12/2/22.     If you have any questions or concerns, please do not hesitate to call.        _____________________________  Physician/APC Signature